# Patient Record
(demographics unavailable — no encounter records)

---

## 2025-01-10 NOTE — CONSULT LETTER
[Today's Date] : [unfilled] [Name] : Name: [unfilled] [] : : ~~ [Today's Date:] : [unfilled] [Dear  ___:] : Dear Dr. [unfilled]: [Consult - Single Provider] : Thank you very much for allowing me to participate in the care of this patient. If you have any questions, please do not hesitate to contact me. [Sincerely,] : Sincerely, [FreeTextEntry9] : 1/9/25 [FreeTextEntry4] : Dr. Ray Seaman [FreeTextEntry5] : [address] [facility] [FreeTextEntry6] : [place] [id number] [FreeTextEntry1] : 1/9/25 [de-identified] : Yury Dee MD, FAAP, FACC, FAHA Chief Emeritus, Division of Pediatric Cardiology [employer] Professor, Department of Pediatrics, [facility]

## 2025-01-10 NOTE — REVIEW OF SYSTEMS
[Acting Fussy] : not acting ~L fussy [Fever] : no fever [Wgt Loss (___ Lbs)] : no recent weight loss [Pallor] : not pale [Eye Discharge] : no eye discharge [Redness] : no redness [Nasal Discharge] : no nasal discharge [Nasal Stuffiness] : no nasal congestion [Sore Throat] : no sore throat [Earache] : no earache [Cyanosis] : no cyanosis [Edema] : no edema [Diaphoresis] : not diaphoretic [Chest Pain] : no chest pain or discomfort [Exercise Intolerance] : no persistence of exercise intolerance [Fast HR] : no tachycardia [Tachypnea] : not tachypneic [Wheezing] : no wheezing [Cough] : no cough [Being A Poor Eater] : not a poor eater [Vomiting] : no vomiting [Diarrhea] : no diarrhea [Decrease In Appetite] : appetite not decreased [Abdominal Pain] : no abdominal pain [Fainting (Syncope)] : no fainting [Seizure] : no seizures [Hypotonicity (Flaccid)] : not hypotonic [Limping] : no limping [Joint Pains] : no arthralgias [Joint Swelling] : no joint swelling [Rash] : no rash [Wound problems] : no wound problems [Bruising] : no tendency for easy bruising [Nosebleeds] : no epistaxis [Swollen Glands] : no lymphadenopathy [Sleep Disturbances] : ~T no sleep disturbances [Hyperactive] : no hyperactive behavior [Failure To Thrive] : no failure to thrive [Short Stature] : short stature was not noted [Dec Urine Output] : no oliguria [FreeTextEntry5] : Circumcised

## 2025-01-10 NOTE — PHYSICAL EXAM
[General Appearance - Alert] : alert [General Appearance - In No Acute Distress] : in no acute distress [General Appearance - Well Nourished] : well nourished [General Appearance - Well Developed] : well developed [General Appearance - Well-Appearing] : well appearing [Appearance Of Head] : the head was normocephalic [Facies] : there were no dysmorphic facial features [Sclera] : the conjunctiva were normal [Outer Ear] : the ears and nose were normal in appearance [Examination Of The Oral Cavity] : mucous membranes were moist and pink [Auscultation Breath Sounds / Voice Sounds] : breath sounds clear to auscultation bilaterally [Normal Chest Appearance] : the chest was normal in appearance [Apical Impulse] : quiet precordium with normal apical impulse [Heart Rate And Rhythm] : normal heart rate and rhythm [Heart Sounds] : normal S1 and S2 [Heart Sounds Gallop] : no gallops [Heart Sounds Pericardial Friction Rub] : no pericardial rub [Edema] : no edema [Arterial Pulses] : normal upper and lower extremity pulses with no pulse delay [Heart Sounds Click] : no clicks [Capillary Refill Test] : normal capillary refill [Systolic] : systolic [II] : a grade 2/6 [LMSB] : LMSB  [Short] : short [Low] : low pitched [Vibratory] : vibratory [Early] : early [[place]] : the murmur was transmitted to the apex [Bowel Sounds] : normal bowel sounds [Abdomen Soft] : soft [Nondistended] : nondistended [Abdomen Tenderness] : non-tender [Nail Clubbing] : no clubbing  or cyanosis of the fingers [Motor Tone] : normal muscle strength and tone [Cervical Lymph Nodes Enlarged Anterior] : The anterior cervical nodes were normal [Cervical Lymph Nodes Enlarged Posterior] : The posterior cervical nodes were normal [Skin Lesions] : no lesions [Skin Turgor] : normal turgor [Demonstrated Behavior - Infant Nonreactive To Parents] : interactive [Mood] : mood and affect were appropriate for age [Demonstrated Behavior] : normal behavior [] : changes with exercise

## 2025-01-10 NOTE — CONSULT LETTER
[Today's Date] : [unfilled] [Name] : Name: [unfilled] [] : : ~~ [Today's Date:] : [unfilled] [Dear  ___:] : Dear Dr. [unfilled]: [Consult - Single Provider] : Thank you very much for allowing me to participate in the care of this patient. If you have any questions, please do not hesitate to contact me. [Sincerely,] : Sincerely, [FreeTextEntry9] : 1/9/25 [FreeTextEntry4] : Dr. Ray Seaman [FreeTextEntry5] : [address] [facility] [FreeTextEntry6] : [place] [id number] [FreeTextEntry1] : 1/9/25 [de-identified] : Yury Dee MD, FAAP, FACC, FAHA Chief Emeritus, Division of Pediatric Cardiology [employer] [facility] Professor, Department of Pediatrics, [facility]

## 2025-01-10 NOTE — PHYSICAL EXAM
[General Appearance - Alert] : alert [General Appearance - In No Acute Distress] : in no acute distress [General Appearance - Well Nourished] : well nourished [General Appearance - Well Developed] : well developed [General Appearance - Well-Appearing] : well appearing [Appearance Of Head] : the head was normocephalic [Facies] : there were no dysmorphic facial features [Sclera] : the conjunctiva were normal [Outer Ear] : the ears and nose were normal in appearance [Examination Of The Oral Cavity] : mucous membranes were moist and pink [Auscultation Breath Sounds / Voice Sounds] : breath sounds clear to auscultation bilaterally [Normal Chest Appearance] : the chest was normal in appearance [Apical Impulse] : quiet precordium with normal apical impulse [Heart Rate And Rhythm] : normal heart rate and rhythm [Heart Sounds] : normal S1 and S2 [Heart Sounds Gallop] : no gallops [Heart Sounds Pericardial Friction Rub] : no pericardial rub [Edema] : no edema [Arterial Pulses] : normal upper and lower extremity pulses with no pulse delay [Heart Sounds Click] : no clicks [Capillary Refill Test] : normal capillary refill [Systolic] : systolic [II] : a grade 2/6 [LMSB] : LMSB  [Short] : short [Low] : low pitched [Vibratory] : vibratory [Early] : early [[place]] : the murmur was transmitted to the apex [Bowel Sounds] : normal bowel sounds [Abdomen Soft] : soft [Nondistended] : nondistended [Abdomen Tenderness] : non-tender [Nail Clubbing] : no clubbing  or cyanosis of the fingers [Motor Tone] : normal muscle strength and tone [Cervical Lymph Nodes Enlarged Anterior] : The anterior cervical nodes were normal [Cervical Lymph Nodes Enlarged Posterior] : The posterior cervical nodes were normal [Skin Lesions] : no lesions [Skin Turgor] : normal turgor [Demonstrated Behavior - Infant Nonreactive To Parents] : interactive [Mood] : mood and affect were appropriate for age [Demonstrated Behavior] : normal behavior [] : changes with exercise

## 2025-01-10 NOTE — CARDIOLOGY SUMMARY
[Today's Date] : [unfilled] [de-identified] : 1/9/25 [FreeTextEntry1] : Sinus rhythm, rate 134/min, QRS axis +32 degrees, NM 0.10, QRS 0.07, QTc 0.42 seconds and is within normal limits for age. [de-identified] : 1/9/25 [FreeTextEntry2] : Summary:  1. {S,D,S\} Situs solitus, D-ventricular looping, normally related great arteries. 2. Normal left ventricular size, morphology and systolic function. 3. Left ventricular ejection fraction by 5/6 Area x Length is normal at 64 %. 4. Normal left ventricular diastolic function. 5. Normal right ventricular morphology with qualitatively normal size and systolic function. 6. No pericardial effusion.

## 2025-01-10 NOTE — CARDIOLOGY SUMMARY
[Today's Date] : [unfilled] [de-identified] : 1/9/25 [FreeTextEntry1] : Sinus rhythm, rate 134/min, QRS axis +32 degrees, ME 0.10, QRS 0.07, QTc 0.42 seconds and is within normal limits for age. [de-identified] : 1/9/25 [FreeTextEntry2] : Summary:  1. {S,D,S\} Situs solitus, D-ventricular looping, normally related great arteries. 2. Normal left ventricular size, morphology and systolic function. 3. Left ventricular ejection fraction by 5/6 Area x Length is normal at 64 %. 4. Normal left ventricular diastolic function. 5. Normal right ventricular morphology with qualitatively normal size and systolic function. 6. No pericardial effusion.